# Patient Record
Sex: FEMALE | Race: WHITE | Employment: UNEMPLOYED | URBAN - METROPOLITAN AREA
[De-identification: names, ages, dates, MRNs, and addresses within clinical notes are randomized per-mention and may not be internally consistent; named-entity substitution may affect disease eponyms.]

---

## 2017-04-20 ENCOUNTER — HOSPITAL ENCOUNTER (EMERGENCY)
Age: 27
Discharge: HOME | End: 2017-04-20
Attending: EMERGENCY MEDICINE | Admitting: EMERGENCY MEDICINE
Payer: SELF-PAY

## 2017-04-20 ENCOUNTER — APPOINTMENT (OUTPATIENT)
Dept: GENERAL RADIOLOGY | Age: 27
End: 2017-04-20
Attending: PHYSICIAN ASSISTANT
Payer: SELF-PAY

## 2017-04-20 VITALS
BODY MASS INDEX: 20.73 KG/M2 | SYSTOLIC BLOOD PRESSURE: 124 MMHG | WEIGHT: 112.66 LBS | HEART RATE: 96 BPM | RESPIRATION RATE: 16 BRPM | HEIGHT: 62 IN | DIASTOLIC BLOOD PRESSURE: 72 MMHG | TEMPERATURE: 98.2 F | OXYGEN SATURATION: 100 %

## 2017-04-20 DIAGNOSIS — S63.502A WRIST SPRAIN, LEFT, INITIAL ENCOUNTER: Primary | ICD-10-CM
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
## 2017-04-20 PROCEDURE — 99282 EMERGENCY DEPT VISIT SF MDM: CPT

## 2017-04-20 PROCEDURE — 73110 X-RAY EXAM OF WRIST: CPT

## 2017-04-20 PROCEDURE — L3809 WHFO W/O JOINTS PRE OTS: HCPCS

## 2017-04-20 RX ORDER — NAPROXEN 500 MG/1
500 TABLET ORAL
Qty: 20 TAB | Refills: 0 | Status: SHIPPED | OUTPATIENT
Start: 2017-04-20

## 2017-04-20 RX ORDER — DEXTROAMPHETAMINE SACCHARATE, AMPHETAMINE ASPARTATE, DEXTROAMPHETAMINE SULFATE AND AMPHETAMINE SULFATE 3.75; 3.75; 3.75; 3.75 MG/1; MG/1; MG/1; MG/1
15 TABLET ORAL 2 TIMES DAILY
COMMUNITY

## 2017-04-20 RX ORDER — HYDROCODONE BITARTRATE AND ACETAMINOPHEN 5; 325 MG/1; MG/1
1 TABLET ORAL
Qty: 16 TAB | Refills: 0 | Status: SHIPPED | OUTPATIENT
Start: 2017-04-20

## 2017-04-20 RX ORDER — DEXTROAMPHETAMINE SACCHARATE, AMPHETAMINE ASPARTATE, DEXTROAMPHETAMINE SULFATE AND AMPHETAMINE SULFATE 7.5; 7.5; 7.5; 7.5 MG/1; MG/1; MG/1; MG/1
30 TABLET ORAL 2 TIMES DAILY
COMMUNITY
End: 2017-04-20

## 2017-04-20 NOTE — LETTER
[address] 
[facility] EMERGENCY DEPT 
[address] [place] [address] [address] [id number] 
[phone] Work/School Note Date: 4/20/2017 To Whom It May concern: 
 
Haydee Alfredo was seen and treated today in the emergency room by the following provider(s): 
Attending Provider: Madeline Gama MD 
Physician Assistant: ARMEN Cheatham. Haydee Alfredo may return to work on 4/23/14 or sooner, if feeling better. Sincerely, Erlinda Kenyon PA

## 2017-04-21 NOTE — DISCHARGE INSTRUCTIONS
Learning About RICE (Rest, Ice, Compression, and Elevation)  What is RICE? RICE is a way to care for an injury. RICE helps relieve pain and swelling. It may also help with healing and flexibility. RICE stands for:  · Rest and protect the injured or sore area. · Ice or a cold pack used as soon as possible. · Compression, or wrapping the injured or sore area with an elastic bandage. · Elevation (propping up) the injured or sore area. How do you do RICE? You can use RICE for home treatment when you have general aches and pains or after an injury or surgery. Rest  · Do not put weight on the injury for at least 24 to 48 hours. · Use crutches for a badly sprained knee or ankle. · Support a sprained wrist, elbow, or shoulder with a sling. Ice  · Put ice or a cold pack on the injury right away to reduce pain and swelling. Frozen vegetables will also work as an ice pack. Put a thin cloth between the ice or cold pack and your skin. The cloth protects the injured area from getting too cold. · Use ice for 10 to 15 minutes at a time for the first 48 to 72 hours. Compression  · Use compression for sprains, strains, and surgeries of the arms and legs. · Wrap the injured area with an elastic bandage or compression sleeve to reduce swelling. · Don't wrap it too tightly. If the area below it feels numb, tingles, or feels cool, loosen the wrap. Elevation  · Use elevation for areas of the body that can be propped up, such as arms and legs. · Prop up the injured area on pillows whenever you use ice. Keep it propped up anytime you sit or lie down. · Try to keep the injured area at or above the level of your heart. This will help reduce swelling and bruising. Where can you learn more? Go to http://bill-mikey.info/. Enter S505 in the search box to learn more about \"Learning About RICE (Rest, Ice, Compression, and Elevation). \"  Current as of: May 23, 2016  Content Version: 11.2  © 2519-6550 Healthwise, Incorporated. Care instructions adapted under license by [employer] (which disclaims liability or warranty for this information). If you have questions about a medical condition or this instruction, always ask your healthcare professional. [address] any warranty or liability for your use of this information. Wrist Sprain: Care Instructions  Your Care Instructions    Your wrist hurts because you have stretched or torn ligaments, which connect the bones in your wrist.  Wrist sprains usually take from 2 to 10 weeks to heal, but some take longer. Usually, the more pain you have, the more severe your wrist sprain is and the longer it will take to heal. You can heal faster and regain strength in your wrist with good home treatment. Follow-up care is a key part of your treatment and safety. Be sure to make and go to all appointments, and call your doctor if you are having problems. It's also a good idea to know your test results and keep a list of the medicines you take. How can you care for yourself at home? · Prop up your arm on a pillow when you ice it or anytime you sit or lie down for the next 3 days. Try to keep your wrist above the level of your heart. This will help reduce swelling. · Put ice or cold packs on your wrist for 10 to 20 minutes at a time. Try to do this every 1 to 2 hours for the next 3 days (when you are awake) or until the swelling goes down. Put a thin cloth between the ice pack and your skin. · After 2 or 3 days, if your swelling is gone, apply a heating pad set on low or a warm cloth to your wrist. This helps keep your wrist flexible. Some doctors suggest that you go back and forth between hot and cold. · If you have an elastic bandage, keep it on for the next 24 to 36 hours. The bandage should be snug but not so tight that it causes numbness or tingling.  To rewrap the wrist, wrap the bandage around the hand a few times, beginning at the fingers. Then wrap it around the hand between the thumb and index finger, ending by circling the wrist several times. · If your doctor gave you a splint or brace, wear it as directed to protect your wrist until it has healed. · Take pain medicines exactly as directed. ¨ If the doctor gave you a prescription medicine for pain, take it as prescribed. ¨ If you are not taking a prescription pain medicine, ask your doctor if you can take an over-the-counter medicine. · Try not to use your injured wrist and hand. When should you call for help? Call your doctor now or seek immediate medical care if:  · Your hand or fingers are cool or pale or change color. Watch closely for changes in your health, and be sure to contact your doctor if:  · Your pain gets worse. · Your wrist has not improved after 1 week. Where can you learn more? Go to http://billLystmikey.info/. Enter G541 in the search box to learn more about \"Wrist Sprain: Care Instructions. \"  Current as of: May 23, 2016  Content Version: 11.2  © 3751-9111 GoRest Software. Care instructions adapted under license by [employer] (which disclaims liability or warranty for this information). If you have questions about a medical condition or this instruction, always ask your healthcare professional. [address] any warranty or liability for your use of this information. Wrist Sprain: Rehab Exercises  Your Care Instructions  Here are some examples of typical rehabilitation exercises for your condition. Start each exercise slowly. Ease off the exercise if you start to have pain. Your doctor or your physical or occupational therapist will tell you when you can start these exercises and which ones will work best for you. How to do the exercises  Resisted wrist extension    1. Sit leaning forward with your legs slightly spread.  Then place your forearm on your thigh with your affected hand and wrist in front of your knee. 2. Grasp one end of an exercise band with your palm down. Step on the other end.  3. Slowly bend your wrist upward for a count of 2. Then lower your wrist slowly to a count of 5.  4. Repeat 8 to 12 times. Resisted wrist flexion    1. Sit leaning forward with your legs slightly spread. Then place your forearm on your thigh with your affected hand and wrist in front of your knee. 2. Grasp one end of an exercise band with your palm up. Step on the other end.  3. Slowly bend your wrist upward for a count of 2. Then lower your wrist slowly to a count of 5.  4. Repeat 8 to 12 times. Resisted radial deviation    1. Sit leaning forward with your legs slightly spread. Then place your forearm on your thigh with your affected hand and wrist in front of your knee. 2. Grasp one end of an exercise band with your hand facing toward your other thigh. Step on the other end.  3. Slowly bend your wrist upward for a count of 2. Then lower your wrist slowly to a count of 5.  4. Repeat 8 to 12 times. Resisted ulnar deviation    1. Sit leaning forward with your legs slightly spread. Then place your forearm on your thigh with your affected hand and wrist by the inside of your knee. 2. Grasp one end of an exercise band with your palm down. Step on the other end with the foot opposite the hand holding the band. 3. Slowly bend your wrist outward and toward your knee for a count of 2. Then slowly move your wrist back to the starting position to a count of 5.  4. Repeat 8 to 12 times. Resisted forearm pronation    1. Sit leaning forward with your legs slightly spread. Then place your forearm on your thigh with your affected hand and wrist in front of your knee. 2. Grasp one end of an exercise band with your palm up. Step on the other end. 3. Keeping your wrist straight, roll your palm inward toward your thigh for a count of 2.  Then slowly move your wrist back to the starting position to a count of 5.  4. Repeat 8 to 12 times. Resisted supination    1. Sit leaning forward with your legs slightly spread. Then place your forearm on your thigh with your affected hand and wrist in front of your knee. 2. Grasp one end of an exercise band with your palm down. Step on the other end. 3. Keeping your wrist straight, roll your palm outward and away from your thigh for a count of 2. Then slowly move your wrist back to the starting position to a count of 5.  4. Repeat 8 to 12 times. Follow-up care is a key part of your treatment and safety. Be sure to make and go to all appointments, and call your doctor if you are having problems. It's also a good idea to know your test results and keep a list of the medicines you take. Where can you learn more? Go to http://bill-mikey.info/. Enter S110 in the search box to learn more about \"Wrist Sprain: Rehab Exercises. \"  Current as of: May 23, 2016  Content Version: 11.2  © 8367-4327 TerraSky, Incorporated. Care instructions adapted under license by [employer] (which disclaims liability or warranty for this information). If you have questions about a medical condition or this instruction, always ask your healthcare professional. [place] [address] any warranty or liability for your use of this information. [facility] Departments     For adult and child immunizations, family planning, TB screening, STD testing and women's health services. [facility]: [place] [phone]      [address]   [address] [place] [address]   [address]   170 [facility]: [place]eth [place] 200 [facility] [phone] 2400 [facility]          [address] [place] [address] [address]     For primary care services, woman and child wellness, and some clinics providing specialty care. U -- 1011 [facility]. [address] [place] [address] [phone]   [address] [place] [address] [facility] [place] 200 [address] [facility] [address] 3614 [facility] [phone]   339 [facility] [address] [phone] 11878 [facility] 1604 [facility] 5850 [facility]  [phone]   [address] [facility] [address] [address] [address] [place] [phone]   [facility] 81 [facility] [phone]   [facility] 1051 [facility] [place] [phone]   [facility]: [facility] [address], ext Betseyjaynaartjamesjaimee 79 [facility] [phone]     [facility] 503 [facility] [address] [phone]   [facility] 5850 [facility]  [phone]   [facility]  [address] ([email or web address].[email or web address]/about/mission. asp) [phone]         Sexual Health/Woman Wellness Clinics    For STD/HIV testing and treatment, pregnancy testing and services, men's health, birth control services, LGBT services, and hepatitis/HPV vaccine services. [address] [employer] [address] for [place] [employer] 201 N. [facility] 75 [facility] 1579 600 [address] [place] [phone]   [facility] [address], 5th floor [phone]   Pregnancy [address] [facility] Women [address]  [facility] [place] [phone]         [place] 9967 [facility] [address] [place] [address]   [phone]   Linda   [phone]   Women, Infant and Children's Services: [address] [phone]       [address] [phone]   [facility]   [phone]   [facility]   [phone]   [facility]     [phone]   [address] Crisis   1212 [facility] [phone]     Local Primary [facility] Physicians  64 [facility] [phone]  Jolane Porter, MD Marisela Casa, MD Romana Milroy, MD Vick Sabina [employer] [facility] [phone]  Yossi Julian, P  Bogdan Muniz, MD Sangeeta Tomas, MD Leonel Castorena [address]  [phone]  MD Lisette Dutton MD [address] [facility] [phone]  Lisa Garcia, MD Abigail Shelby, MD Iris Bell, MD Greta Tesfaye, MD   [facility] [phone]  JOSE ALFREDO RIVERA, MD Arnulfo Corona, MD Cortes Graft, NP [address] [phone]  Danielle Moreira, MD Nubia Allen, MD Sabrina Mehta, MD Jasmin Chang, MD Francisco Porras, MD Violet Lopez, MD See Brady MD   [employer] [place]  Nelson Reynoso, MD [facility] [phone]  Hank Galindo, MD [facility], NP  [facility], MD Kelly Taylor, MD Taylor Schofield, MD Taty Lopez, MD Arlet Smith, MD   4573 [facility] [phone]  Donna Stahl, MD Leny Moise, [facility]  Henry Ochoa, NP  Sirisha Aleman, MD Cynthia Blackwood, MD Katrin Swift, MD Brenda Arcos, MD SMITH [facility] [phone]  Shubham Rosales, MD Fransisca Love, MD Keri Shah, MD Anastasia Dahl MD   [address] [phone]  Clifford Forman, MD Tip East, MD [place] [phone]  MD Ibeth Azar, MD Brea Rousseau MD   [facility] [phone]  MD Andrew Blanton MD Baby Brace, MD Ashley James, MD Edgar Liu, MD Kayla Schmid, NP  Nicky Bee MD [address]   [phone]  MD Chi Jaeger MD Renee Mandril, MD   8890 [facility] [phone]  [address], MD Duane Desanctis, ERNA Chavez, SINA Becerra Generous, ERNA Haq, SINA Galeana, MD Jovani Spain, NP   Issac Gamble,  Miscellaneous:  Trudi Kim [phone]

## 2017-04-21 NOTE — ED PROVIDER NOTES
HPI Comments: Selene Morris is a 32 y.o. female who presents ambulatory to the ED c/o progressively worsening \"thorbbing\" L wrist pain, redness, and swelling since last night. She admits to heavy lifting at work yesterday and today, but denies any acute injury. Pt endorses applying ice to area, but denies applying topical medications or taking oral medications for pain. She denies hx of similar symptoms. Pt specifically denies tingling, numbness, chance of pregnancy, or hx of DM, kidney, liver or thyroid disease. Of note, pt drover herself to the ED. PCP: None    There are no other complaints, changes or physical findings at this time. The history is provided by the patient. Past Medical History:   Diagnosis Date    Psychiatric disorder     ADHD       History reviewed. No pertinent surgical history. History reviewed. No pertinent family history. Social History     Social History    Marital status: SINGLE     Spouse name: N/A    Number of children: N/A    Years of education: N/A     Occupational History    Not on file. Social History Main Topics    Smoking status: Current Every Day Smoker     Packs/day: 0.50    Smokeless tobacco: Not on file    Alcohol use Yes      Comment: \"sometimes\"    Drug use: Not on file    Sexual activity: Not on file     Other Topics Concern    Not on file     Social History Narrative         ALLERGIES: Review of patient's allergies indicates no known allergies. Review of Systems   Constitutional: Negative. Negative for fever. HENT: Negative. Eyes: Negative. Respiratory: Negative. Cardiovascular: Negative. Gastrointestinal: Negative. Negative for constipation, diarrhea, nausea and vomiting. Denies liver disease   Endocrine:        Denies thyroid disease   Genitourinary: Negative. Negative for dysuria. Denies kidney disease   Musculoskeletal: Positive for arthralgias (L wrist) and joint swelling (L wrist).    Skin: Positive for color change (redness to L wrist). Neurological: Negative. All other systems reviewed and are negative. Vitals:    04/20/17 1941   BP: 124/72   Pulse: 96   Resp: 16   Temp: 98.2 °F (36.8 °C)   SpO2: 100%   Weight: 51.1 kg (112 lb 10.5 oz)   Height: 5' 2\" (1.575 m)            Physical Exam   Constitutional: She is oriented to person, place, and time. She appears well-developed and well-nourished. No distress. HENT:   Head: Normocephalic and atraumatic. Right Ear: External ear normal.   Left Ear: External ear normal.   Nose: Nose normal.   Mouth/Throat: Oropharynx is clear and moist. No oropharyngeal exudate. Eyes: Conjunctivae and EOM are normal. Pupils are equal, round, and reactive to light. Right eye exhibits no discharge. Left eye exhibits no discharge. No scleral icterus. Neck: Normal range of motion. Neck supple. No tracheal deviation present. Cardiovascular: Normal rate, regular rhythm, normal heart sounds and intact distal pulses. Exam reveals no gallop and no friction rub. No murmur heard. Pulmonary/Chest: Effort normal and breath sounds normal. No respiratory distress. She has no wheezes. She has no rales. She exhibits no tenderness. Abdominal: Soft. Bowel sounds are normal. She exhibits no distension and no mass. There is no tenderness. There is no rebound and no guarding. Musculoskeletal:   Swelling and erythema to posterior aspect of L wrist and distal L forearm. No calor or contusions. NVID. Flexion and extension of fingers intact. Capillary refill less than 3 seconds. No tenderness to elbow. Lymphadenopathy:     She has no cervical adenopathy. Neurological: She is alert and oriented to person, place, and time. No cranial nerve deficit. Skin: Skin is warm and dry. No rash noted. Psychiatric: She has a normal mood and affect. Her behavior is normal.   Nursing note and vitals reviewed.        MDM  Number of Diagnoses or Management Options  Repetitive use syndrome:   Wrist sprain, left, initial encounter:   Diagnosis management comments: DDx: fracture, sprain, strain, contusion       Amount and/or Complexity of Data Reviewed  Tests in the radiology section of CPT®: ordered and reviewed  Review and summarize past medical records: yes    Patient Progress  Patient progress: stable    ED Course       Procedures    PROGRESS NOTE:  9:49 PM  Pt defers medications for pain in the ED  Written by Ravinder Goncalves ED Scribe, as dictated by SINA Gonzalez    Procedure Note - Ace Wrap Placement:  9:53 PM  Performed by: Gaby Milan  Neurovascularly intact prior to tx. An Ace Wrap was placed on pt's left wrist, forearm. Joint was placed in neutral position. Neurovascularly intact after tx. The procedure took 1-15 minutes, and pt tolerated well. Written by Ravinder Goncalves ED Scribe, as dictated by Gaby Milan. Procedure Note - Splint Placement:  9:54 PM  Performed by: Gaby Milan  Neurovascularly intact prior to tx. A velcro splint was placed on pt's left wrist.  Joint was placed in neutral position. Neurovascularly intact after tx. The procedure took 1-15 minutes, and pt tolerated well. Written by Ravinder oGncalves ED Scribe, as dictated by Gaby Milan      IMAGING RESULTS:  XR WRIST LT AP/LAT/OBL MIN 3V   Final Result   EXAM: XR WRIST LT AP/LAT/OBL MIN 3V     INDICATION: Left wrist swelling and pain after lifting heavy boxes.     COMPARISON: None.     FINDINGS: Three views of the left wrist demonstrate no fracture or other acute  osseous or articular abnormality. The soft tissues moderate soft tissue  swelling.     IMPRESSION  IMPRESSION: No fracture. Nonspecific soft tissue swelling. IMPRESSION:  1. Wrist sprain, left, initial encounter    2. Repetitive use syndrome        PLAN:  1.  Discharge home  Current Discharge Medication List      START taking these medications    Details   naproxen (NAPROSYN) 500 mg tablet Take 1 Tab by mouth every twelve (12) hours as needed for Pain. Qty: 20 Tab, Refills: 0      HYDROcodone-acetaminophen (NORCO) 5-325 mg per tablet Take 1 Tab by mouth every six (6) hours as needed for Pain. Max Daily Amount: 4 Tabs. Qty: 16 Tab, Refills: 0           2. Follow-up Information     Follow up With Details Comments Contact Info    Caroline Grimaldo MD  hand and wrist specialist, if no improvement [phone] [facility]  [address] [phone]      [facility] EMERGENCY DEPT  If symptoms worsen [address] [place] [address] Drive  [address] [facility]  [phone]      Return to ED if worse     DISCHARGE NOTE  9:55 PM  The patient has been re-evaluated and is ready for discharge. Reviewed available results with patient. Counseled pt on diagnosis and care plan. Pt has expressed understanding, and all questions have been answered. Pt agrees with plan and agrees to F/U as recommended, or return to the ED if their sxs worsen. Discharge instructions have been provided and explained to the pt, along with reasons to return to the ED. This note is prepared by Sonia Carlson, acting as Scribe for [employer]. SINA Tovar: The scribe's documentation has been prepared under my direction and personally reviewed by me in its entirety. I confirm that the note above accurately reflects all work, treatment, procedures, and medical decision making performed by me.